# Patient Record
Sex: FEMALE | Race: WHITE | Employment: UNEMPLOYED | URBAN - METROPOLITAN AREA
[De-identification: names, ages, dates, MRNs, and addresses within clinical notes are randomized per-mention and may not be internally consistent; named-entity substitution may affect disease eponyms.]

---

## 2019-07-29 PROBLEM — F84.5 ASPERGER SYNDROME: Status: ACTIVE | Noted: 2019-07-29

## 2020-03-09 PROBLEM — F41.9 ANXIETY: Status: ACTIVE | Noted: 2020-03-09

## 2022-04-20 PROBLEM — G25.81 RESTLESS LEGS SYNDROME (RLS): Status: ACTIVE | Noted: 2022-04-20

## 2022-05-16 PROBLEM — Z78.9 TRANSGENDER: Status: ACTIVE | Noted: 2022-05-16

## 2022-10-31 ENCOUNTER — TELEPHONE (OUTPATIENT)
Dept: FAMILY MEDICINE CLINIC | Age: 22
End: 2022-10-31

## 2022-10-31 NOTE — TELEPHONE ENCOUNTER
----- Message from Joan Chavez sent at 10/28/2022  5:19 PM EDT -----  Regarding: FW: Wrong appt 11/15?    ----- Message -----  From: Jack Gallagher MD  Sent: 10/26/2022  11:08 AM EDT  To: [facility]  Subject: Wrong appt 11/15? She is scheduled for PAP with  Dr. Dinah Peace on 11/15. Wondering if this is an error or switching PCPs.     Thanks,  Mary White

## 2022-11-21 ENCOUNTER — OFFICE VISIT (OUTPATIENT)
Dept: FAMILY MEDICINE CLINIC | Age: 22
End: 2022-11-21
Payer: COMMERCIAL

## 2022-11-21 VITALS
WEIGHT: 150 LBS | OXYGEN SATURATION: 99 % | RESPIRATION RATE: 14 BRPM | DIASTOLIC BLOOD PRESSURE: 86 MMHG | BODY MASS INDEX: 28.34 KG/M2 | HEART RATE: 104 BPM | SYSTOLIC BLOOD PRESSURE: 116 MMHG

## 2022-11-21 DIAGNOSIS — R39.15 URINARY URGENCY: Primary | ICD-10-CM

## 2022-11-21 LAB
BILIRUBIN, POC: ABNORMAL
BLOOD URINE, POC: ABNORMAL
CLARITY, POC: CLEAR
COLOR, POC: YELLOW
GLUCOSE URINE, POC: ABNORMAL
KETONES, POC: ABNORMAL
LEUKOCYTE EST, POC: ABNORMAL
NITRITE, POC: ABNORMAL
PH, POC: 6
PROTEIN, POC: ABNORMAL
SPECIFIC GRAVITY, POC: 1.01
UROBILINOGEN, POC: 0.2

## 2022-11-21 PROCEDURE — 81002 URINALYSIS NONAUTO W/O SCOPE: CPT | Performed by: FAMILY MEDICINE

## 2022-11-21 PROCEDURE — G8484 FLU IMMUNIZE NO ADMIN: HCPCS | Performed by: FAMILY MEDICINE

## 2022-11-21 PROCEDURE — 99213 OFFICE O/P EST LOW 20 MIN: CPT | Performed by: FAMILY MEDICINE

## 2022-11-21 PROCEDURE — G8419 CALC BMI OUT NRM PARAM NOF/U: HCPCS | Performed by: FAMILY MEDICINE

## 2022-11-21 PROCEDURE — 1036F TOBACCO NON-USER: CPT | Performed by: FAMILY MEDICINE

## 2022-11-21 PROCEDURE — G8427 DOCREV CUR MEDS BY ELIG CLIN: HCPCS | Performed by: FAMILY MEDICINE

## 2022-11-21 NOTE — PATIENT INSTRUCTIONS
INSTRUCTIONS  NEXT APPOINTMENT: reschedule PAP soon  Will call with culture results. If NOT UTI, try bladder retraining. Set timer during day/awake time to go every 2 hours to bath room. Over weeks can gradually increase by 30 min intervals.

## 2022-11-21 NOTE — PROGRESS NOTES
PROBLEM VISIT NOTE     Subjective:     Chief Complaint   Patient presents with    Urinary Urgency     Lee Ward is a 23 y.o. adult who presents urinary urgency and sometimes incontinence  Duration over a month  Menses started 2 d ago    Health Maintenance Due   Topic Date Due    HPV vaccine (1 - 2-dose series) Never done    Chlamydia/GC screen  Never done    Pap smear  Never done    COVID-19 Vaccine (3 - Booster for Pfizer series) 06/12/2021    Flu vaccine (1) 08/01/2022     CHART REVIEW   reports that he has never smoked.  He has never used smokeless tobacco.  Health Maintenance Due   Topic Date Due    HPV vaccine (1 - 2-dose series) Never done    Chlamydia/GC screen  Never done    Pap smear  Never done    COVID-19 Vaccine (3 - Booster for Pfizer series) 06/12/2021    Flu vaccine (1) 08/01/2022     Current Outpatient Medications   Medication Instructions    carbidopa-levodopa (SINEMET)  MG per tablet 1 tablet, Oral, NIGHTLY    escitalopram (LEXAPRO) 20 mg, Oral, DAILY     LAST LABS  LDL Calculated   Date Value Ref Range Status   08/22/2022 109 (H) <100 mg/dL Final     Lab Results   Component Value Date    HDL 47 08/22/2022     Lab Results   Component Value Date    TRIG 160 (H) 08/22/2022     Lab Results   Component Value Date     11/10/2018    K 3.7 11/10/2018    CREATININE 0.7 11/10/2018     Lab Results   Component Value Date    WBC 6.7 11/10/2018    HGB 14.0 11/10/2018     11/10/2018     Lab Results   Component Value Date    ALT 12 11/10/2018    AST 23 11/10/2018    ALKPHOS 74 11/10/2018    BILITOT 0.3 11/10/2018     No results found for: TSH  No results found for: LABA1C  Objective:   PHYSICAL EXAM   /86 (Site: Right Upper Arm, Position: Sitting, Cuff Size: Medium Adult)   Pulse (!) 104   Resp 14   Wt 150 lb (68 kg)   SpO2 99%   BMI 28.34 kg/m²   BP Readings from Last 5 Encounters:   11/21/22 116/86   08/22/22 112/80   04/20/22 110/80   03/09/20 118/78   10/26/19 116/72     Wt Readings from Last 5 Encounters:   11/21/22 150 lb (68 kg)   08/22/22 142 lb (64.4 kg)   04/20/22 146 lb (66.2 kg)   03/09/20 129 lb (58.5 kg)   10/26/19 125 lb (56.7 kg)      GENERAL:   well-developed, well-nourished, alert, no distress. LUNGS:    Breathing unlabored     Assessment and Plan:      Diagnosis Orders   1. Urinary urgency  POCT Urinalysis no Micro    Culture, Urine         INSTRUCTIONS  NEXT APPOINTMENT: reschedule PAP soon  Will call with culture results. If NOT UTI, try bladder retraining. Set timer during day/awake time to go every 2 hours to bath room. Over weeks can gradually increase by 30 min intervals.

## 2022-11-22 LAB — URINE CULTURE, ROUTINE: NORMAL

## 2023-01-19 RX ORDER — ESCITALOPRAM OXALATE 20 MG/1
20 TABLET ORAL DAILY
Qty: 90 TABLET | Refills: 0 | Status: SHIPPED | OUTPATIENT
Start: 2023-01-19

## 2023-01-23 ENCOUNTER — OFFICE VISIT (OUTPATIENT)
Dept: FAMILY MEDICINE CLINIC | Age: 23
End: 2023-01-23
Payer: COMMERCIAL

## 2023-01-23 VITALS
HEIGHT: 61 IN | DIASTOLIC BLOOD PRESSURE: 82 MMHG | OXYGEN SATURATION: 98 % | BODY MASS INDEX: 27.56 KG/M2 | HEART RATE: 96 BPM | WEIGHT: 146 LBS | SYSTOLIC BLOOD PRESSURE: 114 MMHG

## 2023-01-23 DIAGNOSIS — Z00.00 WELL ADULT HEALTH CHECK: Primary | ICD-10-CM

## 2023-01-23 DIAGNOSIS — Z12.4 SCREENING FOR CERVICAL CANCER: ICD-10-CM

## 2023-01-23 PROCEDURE — 99395 PREV VISIT EST AGE 18-39: CPT | Performed by: FAMILY MEDICINE

## 2023-01-23 PROCEDURE — G8484 FLU IMMUNIZE NO ADMIN: HCPCS | Performed by: FAMILY MEDICINE

## 2023-01-23 ASSESSMENT — PATIENT HEALTH QUESTIONNAIRE - PHQ9
SUM OF ALL RESPONSES TO PHQ9 QUESTIONS 1 & 2: 1
SUM OF ALL RESPONSES TO PHQ QUESTIONS 1-9: 1
2. FEELING DOWN, DEPRESSED OR HOPELESS: 1
SUM OF ALL RESPONSES TO PHQ QUESTIONS 1-9: 1
1. LITTLE INTEREST OR PLEASURE IN DOING THINGS: 0

## 2023-01-23 NOTE — PATIENT INSTRUCTIONS
INSTRUCTIONS  NEXT APPOINTMENT:  Please schedule check-up in 6 months   Highly recommend getting Gardisil HPV vaccine series of 2. Get second one 6-12 months after the first one.

## 2023-01-23 NOTE — PROGRESS NOTES
GYN EXAM  Subjective:     Chief Complaint   Patient presents with    Gynecologic Exam     Pap today      Albina Patricio is a 23 y.o. adult who presents for annual testing/preventive review and check-up of medical problems listed below:  1. Well adult health check    2. Screening for cervical cancer       Gynecologic History  Patient's last menstrual period was 01/11/2023. OB History    No obstetric history on file. RLS meds is working well. First PAP. Irregular or heavy menses? No  Abnormal vaginal discharge? No  Urinary problems or leakage? No, better after bladder retraining  Contraception: abstinence    CHART REVIEW  Health Maintenance   Topic Date Due    HPV vaccine (1 - 2-dose series) Never done    Chlamydia/GC screen  Never done    Pap smear  Never done    COVID-19 Vaccine (3 - Booster for Pfizer series) 06/12/2021    Flu vaccine (1) 01/23/2024 (Originally 8/1/2022)    Depression Screen  04/20/2023    DTaP/Tdap/Td vaccine (2 - Td or Tdap) 10/08/2024    Meningococcal (ACWY) vaccine  Completed    Hepatitis A vaccine  Aged Out    Hib vaccine  Aged Out    Pneumococcal 0-64 years Vaccine  Aged Out    Varicella vaccine  Discontinued    Hepatitis C screen  Discontinued    HIV screen  Discontinued     The ASCVD Risk score (Stephanie BOWSER, et al., 2019) failed to calculate for the following reasons: The 2019 ASCVD risk score is only valid for ages 36 to 78  Prior to Visit Medications    Medication Sig Taking?  Authorizing Provider   escitalopram (LEXAPRO) 20 MG tablet Take 1 tablet by mouth daily Yes Farrah Rucker MD   carbidopa-levodopa (SINEMET)  MG per tablet Take 1 tablet by mouth nightly Yes Farrah Rucker MD      Family History   Problem Relation Age of Onset    Bipolar Disorder Paternal Grandmother      Social History     Tobacco Use    Smoking status: Never    Smokeless tobacco: Never   Substance Use Topics    Alcohol use: No    Drug use: No      LAST LABS  Cholesterol, Total   Date Value Ref Range Status   08/22/2022 188 0 - 199 mg/dL Final     LDL Calculated   Date Value Ref Range Status   08/22/2022 109 (H) <100 mg/dL Final     HDL   Date Value Ref Range Status   08/22/2022 47 40 - 60 mg/dL Final     Triglycerides   Date Value Ref Range Status   08/22/2022 160 (H) 0 - 150 mg/dL Final     Lab Results   Component Value Date    GLUCOSE 104 (H) 11/10/2018     Lab Results   Component Value Date     11/10/2018    K 3.7 11/10/2018    CREATININE 0.7 11/10/2018     Lab Results   Component Value Date    WBC 6.7 11/10/2018    HGB 14.0 11/10/2018    HCT 41.7 11/10/2018    MCV 85.6 11/10/2018     11/10/2018     Lab Results   Component Value Date    ALT 12 11/10/2018    AST 23 11/10/2018    ALKPHOS 74 11/10/2018    BILITOT 0.3 11/10/2018     No results found for: TSH  No results found for: LABA1C  Objective:   PHYSICAL EXAM  /82 (Site: Right Upper Arm, Position: Sitting, Cuff Size: Medium Adult)   Pulse 96   Ht 5' 1\" (1.549 m)   Wt 146 lb (66.2 kg)   LMP 01/11/2023   SpO2 98%   BMI 27.59 kg/m²   BP Readings from Last 5 Encounters:   01/23/23 114/82   11/21/22 116/86   08/22/22 112/80   04/20/22 110/80   03/09/20 118/78     Wt Readings from Last 5 Encounters:   01/23/23 146 lb (66.2 kg)   11/21/22 150 lb (68 kg)   08/22/22 142 lb (64.4 kg)   04/20/22 146 lb (66.2 kg)   03/09/20 129 lb (58.5 kg)      GENERAL: well-developed, well-nourished, alert, no distress, calm, assistive device: none    NECK: Supple, symmetrical, trachea midline  Thyroid not enlarged, symmetric, no tenderness/mass/nodules  no cervical nodes, no supraclavicular nodes   LUNGS:  Breathing unlabored  good air movement  clear to auscultation bilaterally  CARDIOVASC: regular rate and rhythm, S1, S2 normal, no murmur, click, rub or gallop  Apical impulse normal  BREAST:  No skin changes, normal symmetry  Palpation negative for masses or nodules  No axillary nodes   Female:  Vulva with no masses or lesions  Urethra normal  Vaginal and cervix with normal/physiologic discharge. No abnormality noted. No uterine or adnexal masses or tenderness. Exam limited by  nothing  Some pain/anxiety when first placing speculum and then OK     Assessment and Plan:      Diagnosis Orders   1. Well adult health check  PAP SMEAR    C.trachomatis N.gonorrhoeae DNA      2. Screening for cervical cancer  PAP SMEAR      Stable. Plan as above and below. Counseling regarding:  breast self exam    INSTRUCTIONS  NEXT APPOINTMENT:  Please schedule check-up in 6 months   Highly recommend getting Gardisil HPV vaccine series of 2. Get second one 6-12 months after the first one.

## 2023-01-25 LAB
HPV COMMENT: NORMAL
HPV TYPE 16: NOT DETECTED
HPV TYPE 18: NOT DETECTED
HPVOH (OTHER TYPES): NOT DETECTED

## 2023-01-26 LAB
C. TRACHOMATIS DNA,THIN PREP: NEGATIVE
N. GONORRHOEAE DNA, THIN PREP: NEGATIVE

## 2023-09-11 RX ORDER — ESCITALOPRAM OXALATE 20 MG/1
20 TABLET ORAL DAILY
Qty: 90 TABLET | Refills: 0 | Status: SHIPPED | OUTPATIENT
Start: 2023-09-11

## 2023-12-04 RX ORDER — ESCITALOPRAM OXALATE 20 MG/1
20 TABLET ORAL DAILY
Qty: 90 TABLET | Refills: 0 | OUTPATIENT
Start: 2023-12-04

## 2024-01-17 ENCOUNTER — PATIENT MESSAGE (OUTPATIENT)
Dept: FAMILY MEDICINE CLINIC | Age: 24
End: 2024-01-17

## 2024-01-18 NOTE — TELEPHONE ENCOUNTER
From: Bran Arenas  To: Dr. Nicolette Mcginnis  Sent: 1/17/2024 6:09 PM EST  Subject: vaccines    Hi, I just wanted to let you know that I got vaccinated against the flu, COVID, and HPV on January 15th at the [facility] on [address] [place] [address]. :)

## 2024-02-26 RX ORDER — ESCITALOPRAM OXALATE 20 MG/1
20 TABLET ORAL DAILY
Qty: 30 TABLET | Refills: 0 | Status: SHIPPED | OUTPATIENT
Start: 2024-02-26

## 2024-02-26 NOTE — TELEPHONE ENCOUNTER
Hasn't been seen since January 2023- sent in refill for 30 days, but no further refills until seen. Please assist in scheduling. Thanks!

## 2024-03-20 RX ORDER — ESCITALOPRAM OXALATE 20 MG/1
20 TABLET ORAL DAILY
Qty: 30 TABLET | Refills: 0 | Status: SHIPPED | OUTPATIENT
Start: 2024-03-20

## 2024-04-12 RX ORDER — ESCITALOPRAM OXALATE 20 MG/1
20 TABLET ORAL DAILY
Qty: 30 TABLET | Refills: 1 | Status: SHIPPED | OUTPATIENT
Start: 2024-04-12

## 2024-05-06 RX ORDER — ESCITALOPRAM OXALATE 20 MG/1
20 TABLET ORAL DAILY
Qty: 30 TABLET | Refills: 1 | Status: SHIPPED | OUTPATIENT
Start: 2024-05-06

## 2024-05-16 ASSESSMENT — PATIENT HEALTH QUESTIONNAIRE - PHQ9
SUM OF ALL RESPONSES TO PHQ QUESTIONS 1-9: 1
1. LITTLE INTEREST OR PLEASURE IN DOING THINGS: NOT AT ALL
SUM OF ALL RESPONSES TO PHQ9 QUESTIONS 1 & 2: 1
2. FEELING DOWN, DEPRESSED OR HOPELESS: SEVERAL DAYS
SUM OF ALL RESPONSES TO PHQ QUESTIONS 1-9: 1
SUM OF ALL RESPONSES TO PHQ9 QUESTIONS 1 & 2: 1
SUM OF ALL RESPONSES TO PHQ QUESTIONS 1-9: 1
SUM OF ALL RESPONSES TO PHQ QUESTIONS 1-9: 1
1. LITTLE INTEREST OR PLEASURE IN DOING THINGS: NOT AT ALL
2. FEELING DOWN, DEPRESSED OR HOPELESS: SEVERAL DAYS

## 2024-05-17 ENCOUNTER — OFFICE VISIT (OUTPATIENT)
Dept: FAMILY MEDICINE CLINIC | Age: 24
End: 2024-05-17
Payer: MEDICAID

## 2024-05-17 VITALS
OXYGEN SATURATION: 97 % | HEART RATE: 94 BPM | WEIGHT: 155 LBS | DIASTOLIC BLOOD PRESSURE: 70 MMHG | BODY MASS INDEX: 29.27 KG/M2 | HEIGHT: 61 IN | SYSTOLIC BLOOD PRESSURE: 110 MMHG

## 2024-05-17 DIAGNOSIS — F41.9 ANXIETY: ICD-10-CM

## 2024-05-17 DIAGNOSIS — G25.81 RESTLESS LEGS SYNDROME (RLS): ICD-10-CM

## 2024-05-17 DIAGNOSIS — Z78.9 TRANSGENDER: ICD-10-CM

## 2024-05-17 DIAGNOSIS — F84.5 ASPERGER SYNDROME: Chronic | ICD-10-CM

## 2024-05-17 DIAGNOSIS — Z00.00 ENCOUNTER FOR WELL ADULT EXAM WITHOUT ABNORMAL FINDINGS: Primary | ICD-10-CM

## 2024-05-17 PROCEDURE — 99395 PREV VISIT EST AGE 18-39: CPT | Performed by: FAMILY MEDICINE

## 2024-05-17 NOTE — PATIENT INSTRUCTIONS
INSTRUCTIONS  NEXT APPOINTMENT: Please schedule check-up in 6 months   PLEASE TAKE THIS FORM TO CHECK-OUT WINDOW TO SCHEDULE NEXT VISIT.   Please get annual flu and covid vaccines when available in fall.  Can get at stores such as [employer] and [email or web address].  Can try taking melatonin 90 min to 2 hours before bed.  Try making list of things to do in morning including starting bladder timer. Medications for this cause dry mouth so would like to avoid them.

## 2024-05-17 NOTE — PROGRESS NOTES
(SINEMET)  MG per tablet 1 tablet, Oral, NIGHTLY    escitalopram (LEXAPRO) 20 mg, Oral, DAILY     Family History   Problem Relation Age of Onset    Bipolar Disorder Paternal Grandmother      LAST LABS  Lab Results   Component Value Date    CHOL 188 08/22/2022    TRIG 160 (H) 08/22/2022    HDL 47 08/22/2022     (H) 08/22/2022    VLDL 32 08/22/2022     Lab Results   Component Value Date     11/10/2018    K 3.7 11/10/2018    CREATININE 0.7 11/10/2018     Lab Results   Component Value Date    WBC 6.7 11/10/2018    HGB 14.0 11/10/2018     11/10/2018     Lab Results   Component Value Date    ALT 12 11/10/2018    AST 23 11/10/2018    ALKPHOS 74 11/10/2018    BILITOT 0.3 11/10/2018     No results found for: \"TSH\"  Lab Results   Component Value Date    GLUCOSE 104 (H) 11/10/2018     No results found for: \"LABA1C\"  Objective:   PHYSICAL EXAM   /70 (Site: Right Upper Arm, Position: Sitting, Cuff Size: Medium Adult)   Pulse 94   Ht 1.549 m (5' 1\")   Wt 70.3 kg (155 lb)   LMP 04/21/2024   SpO2 97%   BMI 29.29 kg/m²   BP Readings from Last 5 Encounters:   05/17/24 110/70   01/23/23 114/82   11/21/22 116/86   08/22/22 112/80   04/20/22 110/80     Wt Readings from Last 5 Encounters:   05/17/24 70.3 kg (155 lb)   01/23/23 66.2 kg (146 lb)   11/21/22 68 kg (150 lb)   08/22/22 64.4 kg (142 lb)   04/20/22 66.2 kg (146 lb)   weight gain   GENERAL:   obese  well-developed, alert, no distress.     EYES:   External findings: lids and lashes normal and conjunctivae and sclerae normal  Eyes: no periorbital cellulitis.  ENT:   External nose and ears appear normal  normal TM's and external ear canals both ears  Pharynx: normal. Exudates: None  Lips, mucosa, and tongue normal  Hearing grossly normal.     NECK:   Supple, symmetrical, trachea midline  Thyroid not enlarged, symmetric, no tenderness/mass/nodules  LYMPH:  no cervical nodes, no supraclavicular nodes  LUNGS:    Breathing unlabored  clear to

## 2024-06-29 DIAGNOSIS — F41.9 ANXIETY: Primary | ICD-10-CM

## 2024-07-01 RX ORDER — ESCITALOPRAM OXALATE 20 MG/1
20 TABLET ORAL DAILY
Qty: 90 TABLET | Refills: 1 | Status: SHIPPED | OUTPATIENT
Start: 2024-07-01

## 2024-10-07 RX ORDER — CARBIDOPA/LEVODOPA 10MG-100MG
1 TABLET ORAL NIGHTLY
Qty: 30 TABLET | Refills: 2 | Status: SHIPPED | OUTPATIENT
Start: 2024-10-07

## 2024-11-19 SDOH — ECONOMIC STABILITY: FOOD INSECURITY: WITHIN THE PAST 12 MONTHS, THE FOOD YOU BOUGHT JUST DIDN'T LAST AND YOU DIDN'T HAVE MONEY TO GET MORE.: NEVER TRUE

## 2024-11-19 SDOH — ECONOMIC STABILITY: FOOD INSECURITY: WITHIN THE PAST 12 MONTHS, YOU WORRIED THAT YOUR FOOD WOULD RUN OUT BEFORE YOU GOT MONEY TO BUY MORE.: NEVER TRUE

## 2024-11-19 SDOH — ECONOMIC STABILITY: INCOME INSECURITY: HOW HARD IS IT FOR YOU TO PAY FOR THE VERY BASICS LIKE FOOD, HOUSING, MEDICAL CARE, AND HEATING?: NOT HARD AT ALL

## 2024-11-19 SDOH — ECONOMIC STABILITY: TRANSPORTATION INSECURITY
IN THE PAST 12 MONTHS, HAS LACK OF TRANSPORTATION KEPT YOU FROM MEETINGS, WORK, OR FROM GETTING THINGS NEEDED FOR DAILY LIVING?: PATIENT DECLINED

## 2024-11-22 ENCOUNTER — OFFICE VISIT (OUTPATIENT)
Dept: FAMILY MEDICINE CLINIC | Age: 24
End: 2024-11-22
Payer: MEDICAID

## 2024-11-22 VITALS
HEART RATE: 57 BPM | SYSTOLIC BLOOD PRESSURE: 121 MMHG | HEIGHT: 61 IN | RESPIRATION RATE: 16 BRPM | WEIGHT: 156 LBS | OXYGEN SATURATION: 98 % | BODY MASS INDEX: 29.45 KG/M2 | DIASTOLIC BLOOD PRESSURE: 70 MMHG

## 2024-11-22 DIAGNOSIS — Z23 NEED FOR HPV VACCINATION: ICD-10-CM

## 2024-11-22 DIAGNOSIS — F33.42 RECURRENT MAJOR DEPRESSIVE DISORDER, IN FULL REMISSION (HCC): ICD-10-CM

## 2024-11-22 DIAGNOSIS — N94.6 DYSMENORRHEA: Primary | ICD-10-CM

## 2024-11-22 PROCEDURE — G8419 CALC BMI OUT NRM PARAM NOF/U: HCPCS | Performed by: FAMILY MEDICINE

## 2024-11-22 PROCEDURE — 1036F TOBACCO NON-USER: CPT | Performed by: FAMILY MEDICINE

## 2024-11-22 PROCEDURE — G8484 FLU IMMUNIZE NO ADMIN: HCPCS | Performed by: FAMILY MEDICINE

## 2024-11-22 PROCEDURE — 99214 OFFICE O/P EST MOD 30 MIN: CPT | Performed by: FAMILY MEDICINE

## 2024-11-22 PROCEDURE — G8427 DOCREV CUR MEDS BY ELIG CLIN: HCPCS | Performed by: FAMILY MEDICINE

## 2024-11-22 RX ORDER — NAPROXEN 500 MG/1
500 TABLET ORAL 2 TIMES DAILY PRN
Qty: 60 TABLET | Refills: 0 | Status: SHIPPED | OUTPATIENT
Start: 2024-11-22

## 2024-11-22 RX ORDER — LEVONORGESTREL AND ETHINYL ESTRADIOL 0.15-0.03
1 KIT ORAL DAILY
Qty: 1 PACKET | Refills: 3 | Status: SHIPPED | OUTPATIENT
Start: 2024-11-22

## 2024-11-22 SDOH — ECONOMIC STABILITY: FOOD INSECURITY: WITHIN THE PAST 12 MONTHS, THE FOOD YOU BOUGHT JUST DIDN'T LAST AND YOU DIDN'T HAVE MONEY TO GET MORE.: NEVER TRUE

## 2024-11-22 SDOH — ECONOMIC STABILITY: FOOD INSECURITY: WITHIN THE PAST 12 MONTHS, YOU WORRIED THAT YOUR FOOD WOULD RUN OUT BEFORE YOU GOT MONEY TO BUY MORE.: NEVER TRUE

## 2024-11-22 SDOH — ECONOMIC STABILITY: INCOME INSECURITY: HOW HARD IS IT FOR YOU TO PAY FOR THE VERY BASICS LIKE FOOD, HOUSING, MEDICAL CARE, AND HEATING?: NOT HARD AT ALL

## 2024-11-22 NOTE — PROGRESS NOTES
Mood Visit   Assessment and Plan:      Diagnosis Orders   1. Dysmenorrhea  levonorgestrel-ethinyl estradiol (SEASONALE) 0.15-0.03 MG per tablet    naproxen (NAPROSYN) 500 MG tablet      2. Need for HPV vaccination        3. Recurrent major depressive disorder, in full remission (HCC)        . Plan as above and below.    COUNSELLING  Discussed use, benefit, and side effects of prescribed medications.  Barriers to medication compliance addressed.  All patient questions answered.  Pt voiced understanding.     INSTRUCTIONS  NEXT APPOINTMENT: Please schedule check-up in 3 months    PLEASE TAKE THIS FORM TO CHECK-OUT WINDOW TO SCHEDULE NEXT VISIT.   In place of ibuprofen, try naproxen as soon as period starts.  Start birth control with continuous for 3 months so period every 3 months  Can start pill in next 7 days.     Subjective:     Chief Complaint   Patient presents with    Depression     Pt is here for a f/u     Menstrual Problem     Pt is thinking about getting on birth control to help with the fatigue he has during his cycle       Bran Arenas is a 24 y.o. adult who presents for follow up of mood issue.     A little down with life stuff and weather change  Living with parents. On wait list for [facility]  Feel OK with current med dose.  Regular menses but increased cramps and fatigue. Ibuprofen helps some.    HISTORY  Are you working with a psychologist / psychiatrist?  No  Have you felt your symptoms are better, worse, or unchanged since your last visit   unchanged  Mood is fair.  Sleep is fair. Stays up late playing video games.  Patient denies depression symptoms of: suicidal intention  Patient denies anxiety symptoms of: losing control.     CHART REVIEW  Health Maintenance   Topic Date Due    Hepatitis B vaccine (1 of 3 - 19+ 3-dose series) Never done    HPV vaccine (2 - 3-dose series) 02/12/2024    Flu vaccine (1) 08/01/2024    DTaP/Tdap/Td vaccine (2 - Td or Tdap) 10/08/2024    Depression Screen  05/16/2025

## 2024-11-22 NOTE — PATIENT INSTRUCTIONS
INSTRUCTIONS  NEXT APPOINTMENT: Please schedule check-up in 3 months    PLEASE TAKE THIS FORM TO CHECK-OUT WINDOW TO SCHEDULE NEXT VISIT.   In place of ibuprofen, try naproxen as soon as period starts.  Start birth control with continuous for 3 months so period every 3 months  Can start pill in next 7 days.

## 2024-11-23 ENCOUNTER — PATIENT MESSAGE (OUTPATIENT)
Dept: FAMILY MEDICINE CLINIC | Age: 24
End: 2024-11-23

## 2024-12-23 RX ORDER — CARBIDOPA/LEVODOPA 10MG-100MG
1 TABLET ORAL NIGHTLY
Qty: 30 TABLET | Refills: 2 | Status: SHIPPED | OUTPATIENT
Start: 2024-12-23

## 2025-02-06 DIAGNOSIS — F41.9 ANXIETY: ICD-10-CM

## 2025-02-07 RX ORDER — ESCITALOPRAM OXALATE 20 MG/1
20 TABLET ORAL DAILY
Qty: 90 TABLET | Refills: 0 | Status: SHIPPED | OUTPATIENT
Start: 2025-02-07

## 2025-02-15 DIAGNOSIS — N94.6 DYSMENORRHEA: ICD-10-CM

## 2025-02-17 RX ORDER — LEVONORGESTREL AND ETHINYL ESTRADIOL 0.15-0.03
1 KIT ORAL DAILY
Qty: 1 PACKET | Refills: 0 | Status: SHIPPED | OUTPATIENT
Start: 2025-02-17

## 2025-02-21 ENCOUNTER — PATIENT MESSAGE (OUTPATIENT)
Dept: FAMILY MEDICINE CLINIC | Age: 25
End: 2025-02-21

## 2025-02-21 DIAGNOSIS — N94.6 DYSMENORRHEA: ICD-10-CM

## 2025-02-24 RX ORDER — LEVONORGESTREL AND ETHINYL ESTRADIOL 0.15-0.03
1 KIT ORAL DAILY
Qty: 84 TABLET | Refills: 0 | Status: SHIPPED | OUTPATIENT
Start: 2025-02-24 | End: 2025-05-19

## 2025-03-03 ENCOUNTER — OFFICE VISIT (OUTPATIENT)
Dept: FAMILY MEDICINE CLINIC | Age: 25
End: 2025-03-03

## 2025-03-03 VITALS
DIASTOLIC BLOOD PRESSURE: 82 MMHG | BODY MASS INDEX: 30.96 KG/M2 | SYSTOLIC BLOOD PRESSURE: 110 MMHG | HEIGHT: 61 IN | WEIGHT: 164 LBS | HEART RATE: 102 BPM | OXYGEN SATURATION: 97 %

## 2025-03-03 DIAGNOSIS — N94.5 SECONDARY DYSMENORRHEA: Primary | ICD-10-CM

## 2025-03-03 DIAGNOSIS — F41.9 ANXIETY: ICD-10-CM

## 2025-03-03 DIAGNOSIS — Z78.9 TRANSGENDER: ICD-10-CM

## 2025-03-03 DIAGNOSIS — F33.42 RECURRENT MAJOR DEPRESSIVE DISORDER, IN FULL REMISSION: ICD-10-CM

## 2025-03-03 DIAGNOSIS — F84.5 ASPERGER SYNDROME: Chronic | ICD-10-CM

## 2025-03-03 DIAGNOSIS — Z23 NEEDS FLU SHOT: ICD-10-CM

## 2025-03-03 SDOH — ECONOMIC STABILITY: TRANSPORTATION INSECURITY
IN THE PAST 12 MONTHS, HAS LACK OF TRANSPORTATION KEPT YOU FROM MEETINGS, WORK, OR FROM GETTING THINGS NEEDED FOR DAILY LIVING?: NO

## 2025-03-03 SDOH — ECONOMIC STABILITY: TRANSPORTATION INSECURITY
IN THE PAST 12 MONTHS, HAS THE LACK OF TRANSPORTATION KEPT YOU FROM MEDICAL APPOINTMENTS OR FROM GETTING MEDICATIONS?: NO

## 2025-03-03 SDOH — ECONOMIC STABILITY: FOOD INSECURITY: WITHIN THE PAST 12 MONTHS, YOU WORRIED THAT YOUR FOOD WOULD RUN OUT BEFORE YOU GOT MONEY TO BUY MORE.: NEVER TRUE

## 2025-03-03 SDOH — ECONOMIC STABILITY: FOOD INSECURITY: WITHIN THE PAST 12 MONTHS, THE FOOD YOU BOUGHT JUST DIDN'T LAST AND YOU DIDN'T HAVE MONEY TO GET MORE.: NEVER TRUE

## 2025-03-03 SDOH — ECONOMIC STABILITY: INCOME INSECURITY: IN THE LAST 12 MONTHS, WAS THERE A TIME WHEN YOU WERE NOT ABLE TO PAY THE MORTGAGE OR RENT ON TIME?: NO

## 2025-03-03 ASSESSMENT — PATIENT HEALTH QUESTIONNAIRE - PHQ9
7. TROUBLE CONCENTRATING ON THINGS, SUCH AS READING THE NEWSPAPER OR WATCHING TELEVISION: SEVERAL DAYS
10. IF YOU CHECKED OFF ANY PROBLEMS, HOW DIFFICULT HAVE THESE PROBLEMS MADE IT FOR YOU TO DO YOUR WORK, TAKE CARE OF THINGS AT HOME, OR GET ALONG WITH OTHER PEOPLE: NOT DIFFICULT AT ALL
4. FEELING TIRED OR HAVING LITTLE ENERGY: SEVERAL DAYS
2. FEELING DOWN, DEPRESSED OR HOPELESS: SEVERAL DAYS
2. FEELING DOWN, DEPRESSED OR HOPELESS: SEVERAL DAYS
6. FEELING BAD ABOUT YOURSELF - OR THAT YOU ARE A FAILURE OR HAVE LET YOURSELF OR YOUR FAMILY DOWN: SEVERAL DAYS
8. MOVING OR SPEAKING SO SLOWLY THAT OTHER PEOPLE COULD HAVE NOTICED. OR THE OPPOSITE, BEING SO FIGETY OR RESTLESS THAT YOU HAVE BEEN MOVING AROUND A LOT MORE THAN USUAL: NOT AT ALL
5. POOR APPETITE OR OVEREATING: NOT AT ALL
6. FEELING BAD ABOUT YOURSELF - OR THAT YOU ARE A FAILURE OR HAVE LET YOURSELF OR YOUR FAMILY DOWN: SEVERAL DAYS
8. MOVING OR SPEAKING SO SLOWLY THAT OTHER PEOPLE COULD HAVE NOTICED. OR THE OPPOSITE - BEING SO FIDGETY OR RESTLESS THAT YOU HAVE BEEN MOVING AROUND A LOT MORE THAN USUAL: NOT AT ALL
3. TROUBLE FALLING OR STAYING ASLEEP: MORE THAN HALF THE DAYS
3. TROUBLE FALLING OR STAYING ASLEEP: MORE THAN HALF THE DAYS
4. FEELING TIRED OR HAVING LITTLE ENERGY: SEVERAL DAYS
1. LITTLE INTEREST OR PLEASURE IN DOING THINGS: NOT AT ALL
SUM OF ALL RESPONSES TO PHQ QUESTIONS 1-9: 6
SUM OF ALL RESPONSES TO PHQ QUESTIONS 1-9: 6
10. IF YOU CHECKED OFF ANY PROBLEMS, HOW DIFFICULT HAVE THESE PROBLEMS MADE IT FOR YOU TO DO YOUR WORK, TAKE CARE OF THINGS AT HOME, OR GET ALONG WITH OTHER PEOPLE: NOT DIFFICULT AT ALL
9. THOUGHTS THAT YOU WOULD BE BETTER OFF DEAD, OR OF HURTING YOURSELF: NOT AT ALL
SUM OF ALL RESPONSES TO PHQ QUESTIONS 1-9: 6
9. THOUGHTS THAT YOU WOULD BE BETTER OFF DEAD, OR OF HURTING YOURSELF: NOT AT ALL
SUM OF ALL RESPONSES TO PHQ QUESTIONS 1-9: 6
1. LITTLE INTEREST OR PLEASURE IN DOING THINGS: NOT AT ALL
5. POOR APPETITE OR OVEREATING: NOT AT ALL
7. TROUBLE CONCENTRATING ON THINGS, SUCH AS READING THE NEWSPAPER OR WATCHING TELEVISION: SEVERAL DAYS
SUM OF ALL RESPONSES TO PHQ QUESTIONS 1-9: 6

## 2025-03-03 NOTE — PATIENT INSTRUCTIONS
INSTRUCTIONS  NEXT APPOINTMENT: Please schedule annual complete physical (30 minutes) in 6 months   PLEASE TAKE THIS FORM TO CHECK-OUT WINDOW TO SCHEDULE NEXT VISIT.   Continue birth control pill.

## 2025-03-03 NOTE — PROGRESS NOTES
Component Value Date    ALT 12 11/10/2018    AST 23 11/10/2018    ALKPHOS 74 11/10/2018    BILITOT 0.3 11/10/2018     Lab Results   Component Value Date     11/10/2018    K 3.7 11/10/2018    CREATININE 0.7 11/10/2018     Lab Results   Component Value Date    LABGLOM >60 11/10/2018     CrCl cannot be calculated (Patient's most recent lab result is older than the maximum 180 days allowed.).  Lab Results   Component Value Date    WBC 6.7 11/10/2018    HGB 14.0 11/10/2018     11/10/2018     No results found for: \"TSH\"  Glucose   Date Value Ref Range Status   11/10/2018 104 (H) 70 - 99 mg/dL Final     Glucose, Fasting   Date Value Ref Range Status   08/22/2022 86 70 - 99 mg/dL Final     No results found for: \"LABA1C\"  Objective:   PHYSICAL EXAM   /82 (Site: Right Upper Arm, Position: Sitting, Cuff Size: Medium Adult)   Pulse (!) 102   Ht 1.549 m (5' 1\")   Wt 74.4 kg (164 lb)   LMP 02/16/2025 (Approximate)   SpO2 97%   BMI 30.99 kg/m²   BP Readings from Last 5 Encounters:   03/03/25 110/82   11/22/24 121/70   05/17/24 110/70   01/23/23 114/82   11/21/22 116/86     Wt Readings from Last 5 Encounters:   03/03/25 74.4 kg (164 lb)   11/22/24 70.8 kg (156 lb)   05/17/24 70.3 kg (155 lb)   01/23/23 66.2 kg (146 lb)   11/21/22 68 kg (150 lb)   Weight assessment: weight gain   GENERAL:   Weight: obese  well-developed, alert, no distress.        LUNGS:    Breathing unlabored  SKIN: warm and dry  PSYCH:    Alert and oriented  Normal reasoning, insight fair  Anxious about getting flu shot and took a minute to find words to communicate that  Facial expressions full, mood appropriate  No memory disturbance noted

## 2025-03-07 RX ORDER — CARBIDOPA/LEVODOPA 10MG-100MG
1 TABLET ORAL NIGHTLY
Qty: 30 TABLET | Refills: 2 | Status: SHIPPED | OUTPATIENT
Start: 2025-03-07

## 2025-04-18 DIAGNOSIS — F41.9 ANXIETY: ICD-10-CM

## 2025-04-21 RX ORDER — ESCITALOPRAM OXALATE 20 MG/1
20 TABLET ORAL DAILY
Qty: 30 TABLET | Refills: 2 | Status: SHIPPED | OUTPATIENT
Start: 2025-04-21

## 2025-05-26 ENCOUNTER — TELEPHONE (OUTPATIENT)
Dept: FAMILY MEDICINE CLINIC | Age: 25
End: 2025-05-26

## 2025-05-26 DIAGNOSIS — N94.6 DYSMENORRHEA: ICD-10-CM

## 2025-05-29 RX ORDER — LEVONORGESTREL AND ETHINYL ESTRADIOL 0.15-0.03
1 KIT ORAL DAILY
Qty: 91 TABLET | Refills: 1 | Status: SHIPPED | OUTPATIENT
Start: 2025-05-29

## 2025-06-18 RX ORDER — CARBIDOPA/LEVODOPA 10MG-100MG
1 TABLET ORAL NIGHTLY
Qty: 30 TABLET | Refills: 2 | Status: SHIPPED | OUTPATIENT
Start: 2025-06-18

## 2025-08-05 DIAGNOSIS — F41.9 ANXIETY: ICD-10-CM

## 2025-08-05 RX ORDER — ESCITALOPRAM OXALATE 20 MG/1
20 TABLET ORAL DAILY
Qty: 30 TABLET | Refills: 1 | Status: SHIPPED | OUTPATIENT
Start: 2025-08-05

## 2025-09-05 ENCOUNTER — OFFICE VISIT (OUTPATIENT)
Dept: FAMILY MEDICINE CLINIC | Age: 25
End: 2025-09-05
Payer: MEDICAID

## 2025-09-05 VITALS
SYSTOLIC BLOOD PRESSURE: 122 MMHG | RESPIRATION RATE: 16 BRPM | HEIGHT: 61 IN | HEART RATE: 78 BPM | WEIGHT: 160 LBS | OXYGEN SATURATION: 97 % | DIASTOLIC BLOOD PRESSURE: 84 MMHG | BODY MASS INDEX: 30.21 KG/M2

## 2025-09-05 DIAGNOSIS — N94.6 DYSMENORRHEA: ICD-10-CM

## 2025-09-05 DIAGNOSIS — R35.0 URINARY FREQUENCY: ICD-10-CM

## 2025-09-05 DIAGNOSIS — Z23 NEED FOR HEPATITIS B VACCINATION: ICD-10-CM

## 2025-09-05 DIAGNOSIS — R63.1 INCREASED THIRST: ICD-10-CM

## 2025-09-05 DIAGNOSIS — Z78.9 TRANSGENDER: ICD-10-CM

## 2025-09-05 DIAGNOSIS — Z30.41 ENCOUNTER FOR SURVEILLANCE OF CONTRACEPTIVE PILLS: ICD-10-CM

## 2025-09-05 DIAGNOSIS — F84.5 ASPERGER SYNDROME: Chronic | ICD-10-CM

## 2025-09-05 DIAGNOSIS — F33.42 RECURRENT MAJOR DEPRESSIVE DISORDER, IN FULL REMISSION: ICD-10-CM

## 2025-09-05 DIAGNOSIS — Z23 NEED FOR TETANUS BOOSTER: ICD-10-CM

## 2025-09-05 DIAGNOSIS — Z00.00 WELL ADULT HEALTH CHECK: Primary | ICD-10-CM

## 2025-09-05 LAB
BILIRUBIN, POC: NORMAL
BLOOD URINE, POC: NORMAL
CLARITY, POC: CLEAR
COLOR, POC: YELLOW
GLUCOSE URINE, POC: NORMAL MG/DL
KETONES, POC: NORMAL MG/DL
LEUKOCYTE EST, POC: NORMAL
NITRITE, POC: NORMAL
PH, POC: 6
PROTEIN, POC: 30 MG/DL
SPECIFIC GRAVITY, POC: 1.02
UROBILINOGEN, POC: 0.2 MG/DL

## 2025-09-05 PROCEDURE — 99395 PREV VISIT EST AGE 18-39: CPT | Performed by: FAMILY MEDICINE

## 2025-09-05 PROCEDURE — 81002 URINALYSIS NONAUTO W/O SCOPE: CPT | Performed by: FAMILY MEDICINE

## 2025-09-05 RX ORDER — LEVONORGESTREL AND ETHINYL ESTRADIOL 0.15-0.03
1 KIT ORAL DAILY
Qty: 91 TABLET | Refills: 3 | Status: SHIPPED | OUTPATIENT
Start: 2025-09-05

## 2025-09-07 LAB — BACTERIA UR CULT: NORMAL
